# Patient Record
Sex: FEMALE | Race: WHITE | NOT HISPANIC OR LATINO | Employment: FULL TIME | URBAN - METROPOLITAN AREA
[De-identification: names, ages, dates, MRNs, and addresses within clinical notes are randomized per-mention and may not be internally consistent; named-entity substitution may affect disease eponyms.]

---

## 2022-09-21 ENCOUNTER — OFFICE VISIT (OUTPATIENT)
Dept: URGENT CARE | Facility: CLINIC | Age: 37
End: 2022-09-21
Payer: COMMERCIAL

## 2022-09-21 VITALS
RESPIRATION RATE: 18 BRPM | BODY MASS INDEX: 44.33 KG/M2 | HEART RATE: 83 BPM | HEIGHT: 66 IN | WEIGHT: 275.81 LBS | SYSTOLIC BLOOD PRESSURE: 119 MMHG | DIASTOLIC BLOOD PRESSURE: 81 MMHG | OXYGEN SATURATION: 95 % | TEMPERATURE: 98 F

## 2022-09-21 DIAGNOSIS — Z11.52 ENCOUNTER FOR SCREENING FOR COVID-19: Primary | ICD-10-CM

## 2022-09-21 DIAGNOSIS — J06.9 UPPER RESPIRATORY TRACT INFECTION, UNSPECIFIED TYPE: ICD-10-CM

## 2022-09-21 DIAGNOSIS — J02.9 SORE THROAT: ICD-10-CM

## 2022-09-21 DIAGNOSIS — R68.89 FLU-LIKE SYMPTOMS: ICD-10-CM

## 2022-09-21 LAB
CTP QC/QA: YES
FLUAV AG NPH QL: NEGATIVE
FLUBV AG NPH QL: NEGATIVE
MOLECULAR STREP A: NEGATIVE
SARS-COV-2 RDRP RESP QL NAA+PROBE: NEGATIVE

## 2022-09-21 PROCEDURE — 87651 STREP A DNA AMP PROBE: CPT | Mod: PBBFAC | Performed by: FAMILY MEDICINE

## 2022-09-21 PROCEDURE — U0002 COVID-19 LAB TEST NON-CDC: HCPCS | Mod: PBBFAC | Performed by: FAMILY MEDICINE

## 2022-09-21 PROCEDURE — 99214 PR OFFICE/OUTPT VISIT, EST, LEVL IV, 30-39 MIN: ICD-10-PCS | Mod: S$PBB,,, | Performed by: FAMILY MEDICINE

## 2022-09-21 PROCEDURE — 87081 CULTURE SCREEN ONLY: CPT | Performed by: FAMILY MEDICINE

## 2022-09-21 PROCEDURE — 99214 OFFICE O/P EST MOD 30 MIN: CPT | Mod: S$PBB,,, | Performed by: FAMILY MEDICINE

## 2022-09-21 PROCEDURE — 99203 OFFICE O/P NEW LOW 30 MIN: CPT | Mod: PBBFAC | Performed by: FAMILY MEDICINE

## 2022-09-21 PROCEDURE — 87804 INFLUENZA ASSAY W/OPTIC: CPT | Mod: 59,PBBFAC | Performed by: FAMILY MEDICINE

## 2022-09-21 RX ORDER — VENLAFAXINE HYDROCHLORIDE 37.5 MG/1
37.5 CAPSULE, EXTENDED RELEASE ORAL DAILY
COMMUNITY
Start: 2022-08-30

## 2022-09-21 RX ORDER — DEXAMETHASONE SODIUM PHOSPHATE 4 MG/ML
8 INJECTION, SOLUTION INTRA-ARTICULAR; INTRALESIONAL; INTRAMUSCULAR; INTRAVENOUS; SOFT TISSUE ONCE
Status: COMPLETED | OUTPATIENT
Start: 2022-09-21 | End: 2022-09-21

## 2022-09-21 RX ORDER — LEVOTHYROXINE SODIUM 200 UG/1
TABLET ORAL
COMMUNITY
Start: 2022-08-30

## 2022-09-21 RX ADMIN — DEXAMETHASONE SODIUM PHOSPHATE 8 MG: 4 INJECTION, SOLUTION INTRA-ARTICULAR; INTRALESIONAL; INTRAMUSCULAR; INTRAVENOUS; SOFT TISSUE at 03:09

## 2022-09-21 NOTE — PROGRESS NOTES
"Subjective:       Patient ID: Jory Lares is a 37 y.o. female.    Chief Complaint: Headache, sinus drip, Otalgia (right), and Sore Throat      HPI  36 yo female with HA, post nasal drip, right ear pain, and sore throat for 3 days.  Works in a hospital.  Tried OTC medications with some improvement.   Review of Systems   HENT:          As above   Neurological:         As above       Objective:       Vital Signs  Temp: 98.1 °F (36.7 °C)  Temp src: Oral  Pulse: 83  Resp: 18  SpO2: 95 %  BP: 119/81  Height and Weight  Height: 5' 5.75" (167 cm)  Weight: 125.1 kg (275 lb 12.8 oz)  BSA (Calculated - sq m): 2.41 sq meters  BMI (Calculated): 44.9  Weight in (lb) to have BMI = 25: 153.4]  Physical Exam  Vitals reviewed.   Constitutional:       Appearance: Normal appearance.   HENT:      Head: Normocephalic and atraumatic.      Nose: Congestion present. No rhinorrhea.      Mouth/Throat:      Pharynx: Posterior oropharyngeal erythema present. No oropharyngeal exudate.   Eyes:      Extraocular Movements: Extraocular movements intact.      Conjunctiva/sclera: Conjunctivae normal.   Cardiovascular:      Rate and Rhythm: Normal rate and regular rhythm.      Heart sounds: Normal heart sounds.   Pulmonary:      Breath sounds: Normal breath sounds.   Musculoskeletal:      Cervical back: No tenderness.   Lymphadenopathy:      Cervical: No cervical adenopathy.   Skin:     General: Skin is warm and dry.   Neurological:      General: No focal deficit present.      Mental Status: She is alert.   Psychiatric:         Mood and Affect: Mood and affect normal.         Speech: Speech normal.         Behavior: Behavior normal. Behavior is cooperative.         Thought Content: Thought content does not include homicidal or suicidal ideation.       Assessment:       Problem List Items Addressed This Visit    None  Visit Diagnoses       Encounter for screening for COVID-19    -  Primary    Relevant Orders    POCT COVID-19 Rapid Screening " (Completed)    Flu-like symptoms        Relevant Orders    POCT Influenza A/B (Completed)    Sore throat        Relevant Orders    POCT Strep A, Molecular (Completed)    Upper respiratory tract infection, unspecified type        Relevant Medications    dexamethasone injection 8 mg (Start on 9/21/2022  3:15 PM)              Plan:           Encouraged ibuprofen/acetaminophen as needed  ER precautions  Follow-up with PCP

## 2022-09-24 LAB — BACTERIA THROAT CULT: NORMAL

## 2024-08-22 ENCOUNTER — TELEPHONE (OUTPATIENT)
Dept: SURGERY | Facility: CLINIC | Age: 39
End: 2024-08-22
Payer: COMMERCIAL

## 2024-09-03 ENCOUNTER — TELEPHONE (OUTPATIENT)
Dept: SURGERY | Facility: CLINIC | Age: 39
End: 2024-09-03
Payer: COMMERCIAL

## 2024-09-04 ENCOUNTER — CLINICAL SUPPORT (OUTPATIENT)
Dept: BARIATRICS | Facility: HOSPITAL | Age: 39
End: 2024-09-04

## 2024-09-04 ENCOUNTER — OFFICE VISIT (OUTPATIENT)
Dept: SURGERY | Facility: CLINIC | Age: 39
End: 2024-09-04
Payer: COMMERCIAL

## 2024-09-04 VITALS
BODY MASS INDEX: 48.32 KG/M2 | HEIGHT: 64 IN | WEIGHT: 283.69 LBS | HEIGHT: 64 IN | BODY MASS INDEX: 48.43 KG/M2 | WEIGHT: 283 LBS

## 2024-09-04 VITALS
HEIGHT: 64 IN | HEART RATE: 95 BPM | BODY MASS INDEX: 48.08 KG/M2 | WEIGHT: 281.63 LBS | SYSTOLIC BLOOD PRESSURE: 136 MMHG | DIASTOLIC BLOOD PRESSURE: 87 MMHG

## 2024-09-04 DIAGNOSIS — I10 HYPERTENSION, UNSPECIFIED TYPE: Primary | ICD-10-CM

## 2024-09-04 DIAGNOSIS — E66.01 MORBID OBESITY WITH BMI OF 45.0-49.9, ADULT: Primary | ICD-10-CM

## 2024-09-04 DIAGNOSIS — E66.9 OBESITY: Primary | ICD-10-CM

## 2024-09-04 PROCEDURE — 1160F RVW MEDS BY RX/DR IN RCRD: CPT | Mod: CPTII,,, | Performed by: SURGERY

## 2024-09-04 PROCEDURE — 3075F SYST BP GE 130 - 139MM HG: CPT | Mod: CPTII,,, | Performed by: SURGERY

## 2024-09-04 PROCEDURE — 99204 OFFICE O/P NEW MOD 45 MIN: CPT | Mod: ,,, | Performed by: SURGERY

## 2024-09-04 PROCEDURE — 1159F MED LIST DOCD IN RCRD: CPT | Mod: CPTII,,, | Performed by: SURGERY

## 2024-09-04 PROCEDURE — 3079F DIAST BP 80-89 MM HG: CPT | Mod: CPTII,,, | Performed by: SURGERY

## 2024-09-04 PROCEDURE — 3008F BODY MASS INDEX DOCD: CPT | Mod: CPTII,,, | Performed by: SURGERY

## 2024-09-04 RX ORDER — HYDROCHLOROTHIAZIDE 12.5 MG/1
12.5 TABLET ORAL
COMMUNITY
Start: 2024-08-28

## 2024-09-04 NOTE — PROGRESS NOTES
"NUTRITIONAL CONSULT    Initial assessment for sleeve gastrectomy  Non Surgical: []    PAST HISTORY:   Dieting attempts include weight watchers, Keto , and Optavia/ Medifast  Highest adult weight: 305#  How many years at present wt: 2 years  Greatest single wt loss: 60# with diet (not set commercial plan) and exercise     CLINICAL DATA:  Height:   Ht Readings from Last 1 Encounters:   09/04/24 5' 4" (1.626 m)      Weight:   Wt Readings from Last 3 Encounters:   09/04/24 1344 128.4 kg (283 lb)   09/04/24 1341 128.7 kg (283 lb 11.2 oz)   09/04/24 1311 128.7 kg (283 lb 11.2 oz)   09/04/24 1258 128.7 kg (283 lb 11.2 oz)   09/21/22 1344 125.1 kg (275 lb 12.8 oz)      IBW: 120 lbs   BMI:   BMI Readings from Last 1 Encounters:   09/04/24 48.58 kg/m²      Bariatric goal weight (125% EBW): 150 lbs  Patient's goal weight: 150 lbs    FAMILY HISTORY OF OBESITY:  Yes           WHAT SIDE OF THE FAMILY?   [x]Mother   [x]Father   [x]Sibling   []Child     [x]Extended    []Adopted       Goal for Bariatric Surgery: to improve health; HTN resolution, set an example for son.    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 Eats daily.  Fruits: Likes a variety. Eats almost daily.  Beverages: water and diet soda  Alcohol consumption: Monthly. Type: mixed drinks  Dining out: Weekly. Mostly fast food and restaurants.  Grocery shopping and food prep: Yes[x]Self   []Spouse   []Other:   Emotional eating: Yes Which emotions if yes: from bad day  Nighttime snacking: Yes Middle of night: Yes Before bedtime: Yes  Hx of disordered eating behaviors: No Anorexia: No Bulimia: No Purging: No Binging:No  History of vitamin/mineral " deficiencies:   No    Vitamins / Minerals / Herbs:   Barilife MVI, Hair and Nail, and Ca chews    Food Allergies:   NKFA      ASSESSMENT:  Patient reports attempts at weight loss, only to regain lost weight.  Patient demonstrated knowledge of healthy eating behaviors and exercise patterns; admits to not eating healthy and not exercising at this point.  Patient states willingness and demonstrates willingness to change lifestyle and make behavior modifications.  Expect good  compliance after surgery at this time.        ESTIMATED NEEDS:  Calories: 1426-6375 kcals/d (20-25 kcal/kg adjusted BW/d)  Protein: 68-74 g/d (1.0-1.1 g/kg adjusted BW/d)  Fluid:  2568/ ml/d (20 mL/kg actual BW/d)    BARIATRIC DIET DISCUSSION:  Discussed diet after surgery and related to patients food record.  Reviewed diet progression before and after surgery.  Reinforced that surgery is not a magic bullet and importance of low fat foods and no snacking.    RECOMMENDATIONS:  Patient is a good candidate for bariatric surgery.      PLAN:  Resume work-up for surgery.  Continue to review Bariatric Nutrition Guidebook at home and call with any questions.  Work on Bariatric Nutrition Checklist.

## 2024-09-04 NOTE — PROGRESS NOTES
BEHAVIOR MODIFICATION AND EXERCISE CONSULT    PERSONAL:     What initiated your interest in bariatric surgery?  Have always struggled with weight and diagnosed with HTN.     Marital Status: []Single   [x]   []   []      Do you have children? 1 (13 y.o.)    Do you have childcare issues?    What is your highest level of education completed? Associates    Who is your social or relational support? My spouse and family    Do you work? 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 yes    Were there any particular events that lead to significant weight gain? []Loss of a loved one  []Pregnancy  []Trauma, accident, illness  []Loss of employment [x]Other    PHYSICAL ACTIVITY:    Do you currently exercise: [x]Yes   []No    If so, please describe: workout with weight and 30 min. Of cardio (riding bike, elliptical, walking pad).    Have you experienced any injuries and/or restrictions that may limit your physical activity? []Yes   [x]No    If so, please describe:     BEHAVIORS:    What behavior(s) would you like to change in order to be healthy? Eat habits, portion control, night time eater    On a scale of 1-10 (1-extremely low, 10-extremely high), how motivated are you to change your behavior(s)? 10    Do you currently use any type of tobacco products (vape, dip, cigarettes, etc.) No    If yes, on average, how many and/or how often do you use these products on a daily basis?    How many hours of sleep do you average? 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 Eating and Workout (weights/beachbody)    Is your appetite affected by stress, boredom, depression, etc.? yes    Have you ever seen a counselor or therapist? No       CURRENT WEIGHT: 283.7 HIGHEST WEIGHT: 305 LOWEST ADULT  WEIGHT: 175 GOAL WEIGHT: under 200     WAIST/HIP: 52/57    HANDOUTS: Emotional connections to food    NOTES: Body composition was conducted and patient was educated on results.      CECILIA Farr, CPT, CHC

## 2024-09-06 ENCOUNTER — LAB VISIT (OUTPATIENT)
Dept: LAB | Facility: HOSPITAL | Age: 39
End: 2024-09-06
Attending: SURGERY
Payer: COMMERCIAL

## 2024-09-06 DIAGNOSIS — K27.9 PEPTIC ULCER WITHOUT HEMORRHAGE OR PERFORATION BUT WITH OBSTRUCTION: Primary | ICD-10-CM

## 2024-09-06 DIAGNOSIS — K56.609 PEPTIC ULCER WITHOUT HEMORRHAGE OR PERFORATION BUT WITH OBSTRUCTION: Primary | ICD-10-CM

## 2024-09-06 PROCEDURE — 83013 H PYLORI (C-13) BREATH: CPT

## 2024-09-07 LAB — UREA BREATH TEST QL: NEGATIVE

## 2024-09-26 NOTE — PROGRESS NOTES
"HISTORY & PHYSICAL  Bariatric Consultative Note  General Surgery    Patient Name: Jory Lares  YOB: 1985    Date: 9/4/24                   SUBJECTIVE:     Chief Complaint/Reason for Admission:   Chief Complaint   Patient presents with    Consult     Interested in VSG         History of Present Illness:    Jory Lares is a 39 y.o. year old female, 5'4",  281 lbs., (Body mass index is 48.34 kg/m².).   She has been more than 100 lbs. overweight for the past 11+ and is currently at She greatest weight.  She has tried numerous weight loss progrmas including:  Exercise, Calorie Counting, self-directed and physician supervised diets and has lost up to 20-30 lbs. on more than one occasion only to regain the weight.     The patient is not a diabetic.  She is not an asthma and denies obstructive sleep apnea. She  denies weight bearing joint pain, and denies low back pain. She denies kidney / urinary tract disease but has occasional headaches, and denies dizziness, seizure or neurological disorders. She denies thyroid disease, adrenal, pituitary disease, depression or psychiatric disorder. The patient does not have a history of gallstones. She denies heartburn, ulcer or liver disease. She  reports hypertension but denies high cholesterol, heart attack or stroke. She denies anemia, bleeding disorder, thrombosis, clotting disorder or easy bruisability. She denies peripheral edema.    Review of Systems:  12 point ROS negative except as stated in HPI    PAST HISTORY:     Past Medical History:   Diagnosis Date    Anxiety 2012    Hypertension 2022    Thyroid disease 2008    Total thyroidectomy     Past Surgical History:   Procedure Laterality Date    ANKLE FUSION      LUMPECTOMY, BREAST      TONSILLECTOMY  2000     Family History   Problem Relation Name Age of Onset    Hypertension Father       Social History     Socioeconomic History    Marital status:    Tobacco Use    Smoking " "status: Never    Smokeless tobacco: Never   Substance and Sexual Activity    Alcohol use: Not Currently    Drug use: Never    Sexual activity: Yes     Partners: Male     Birth control/protection: I.U.D.     Comment: Mirena IUD     Social Determinants of Health     Financial Resource Strain: Low Risk  (9/3/2024)    Overall Financial Resource Strain (CARDIA)     Difficulty of Paying Living Expenses: Not hard at all   Food Insecurity: No Food Insecurity (9/3/2024)    Hunger Vital Sign     Worried About Running Out of Food in the Last Year: Never true     Ran Out of Food in the Last Year: Never true   Physical Activity: Insufficiently Active (9/3/2024)    Exercise Vital Sign     Days of Exercise per Week: 3 days     Minutes of Exercise per Session: 30 min   Stress: No Stress Concern Present (9/3/2024)    [employer] [place] [employer] - Occupational Stress Questionnaire     Feeling of Stress : Only a little   Housing Stability: Unknown (9/3/2024)    Housing Stability Vital Sign     Unable to Pay for Housing in the Last Year: No       MEDICATIONS & ALLERGIES:     Current Outpatient Medications on File Prior to Visit   Medication Sig    hydroCHLOROthiazide (HYDRODIURIL) 12.5 MG Tab Take 12.5 mg by mouth.    levothyroxine (SYNTHROID) 200 MCG tablet Take by mouth.    venlafaxine (EFFEXOR-XR) 37.5 MG 24 hr capsule Take 37.5 mg by mouth once daily.     No current facility-administered medications on file prior to visit.     Review of patient's allergies indicates:  No Known Allergies    OBJECTIVE:   Visit Vitals  /87   Pulse 95   Ht 5' 4" (1.626 m)   Wt 127.7 kg (281 lb 9.6 oz)   BMI 48.34 kg/m²       Physical Exam:  General:  Well developed, well nourished, no acute distress  HEENT:  Normocephalic, atraumatic, PERRL, EOMI, clear sclera, ears normal, neck supple, throat clear without erythema or exudates  CVS:  RRR, S1 and S2 normal, no murmurs, rubs, gallops  Resp:  Lungs clear to auscultation, no wheezes, " rales, rhonchi, cough  GI:  Abdomen soft, non-tender, non-distended, normoactive bowel sounds, no masses  :  Deferred  MSK:  No muscle atrophy, cyanosis, peripheral edema, full range of motion  Skin:  No rashes, ulcers, erythema  Neuro:  CNII-XII grossly intact  Psych:  Alert and oriented to person, place, and time    Results:  I have independently reviewed all pertinent lab and radiologic studies relevant to general/bariatric surgery.      VISIT DIAGNOSES:       ICD-10-CM ICD-9-CM   1. Hypertension, unspecified type  I10 401.9       ASSESSMENT/PLAN:     The patient is a morbidly obese female with a Body mass index is 48.34 kg/m². with significant weight related co-morbidity including:  Hypertension  She has been unable to lose her weight and improve her co-morbid conditions with medical management including diet and exercise.      RECOMMENDATION: Weight loss surgery. The risks, benefits and alternatives, including laparoscopic gastric bypass, laparoscopic vertical sleeve gastrectomy and laparoscopic gastric banding were discussed at length and all of her questions were answered. The patient decided to undergo a Vertical Sleeve Gastrectomy. The patient appears to understand and wishes to proceed.      The patient was given the following instructions:  She needs a complete medical evaluation.  She needs a serologic test for H.Pylori and if positive will need an upper endoscopy.  She needs dietary and psychological evaluations.  She must attend a preoperative support group meeting.    The patient clearly understood that surgery would only be scheduled if there are no medical or psychiatric contraindications and a second office visit is required.

## 2024-11-30 ENCOUNTER — IMMUNIZATION (OUTPATIENT)
Dept: URGENT CARE | Facility: CLINIC | Age: 39
End: 2024-11-30
Payer: COMMERCIAL

## 2024-11-30 DIAGNOSIS — Z23 ENCOUNTER FOR ADMINISTRATION OF VACCINE: Primary | ICD-10-CM

## 2024-11-30 PROCEDURE — 90656 IIV3 VACC NO PRSV 0.5 ML IM: CPT | Performed by: FAMILY MEDICINE

## 2024-11-30 PROCEDURE — 63600175 PHARM REV CODE 636 W HCPCS: Performed by: FAMILY MEDICINE

## 2024-11-30 PROCEDURE — 90471 IMMUNIZATION ADMIN: CPT | Performed by: FAMILY MEDICINE

## 2024-11-30 NOTE — PROGRESS NOTES
Subjective:      Patient ID: Jory Lares is a 39 y.o. female.    Vitals:  vitals were not taken for this visit.     Chief Complaint: Flu Vaccine    HPI  ROS   Objective:     Physical Exam    Assessment:     1. Encounter for administration of vaccine        Plan:       Encounter for administration of vaccine  -     influenza (Flulaval, Fluzone, Fluarix) 45 mcg/0.5 mL IM vaccine (> or = 6 mo) 0.5 mL